# Patient Record
Sex: MALE | Race: WHITE | NOT HISPANIC OR LATINO | Employment: OTHER | URBAN - METROPOLITAN AREA
[De-identification: names, ages, dates, MRNs, and addresses within clinical notes are randomized per-mention and may not be internally consistent; named-entity substitution may affect disease eponyms.]

---

## 2023-08-22 PROBLEM — R00.2 PALPITATIONS: Status: ACTIVE | Noted: 2023-08-22

## 2023-08-22 PROBLEM — J44.9 COPD (CHRONIC OBSTRUCTIVE PULMONARY DISEASE) (MULTI): Status: ACTIVE | Noted: 2023-08-22

## 2023-08-22 PROBLEM — K21.9 GERD (GASTROESOPHAGEAL REFLUX DISEASE): Status: ACTIVE | Noted: 2023-08-22

## 2023-08-22 PROBLEM — G47.30 SLEEP APNEA: Status: ACTIVE | Noted: 2023-08-22

## 2023-08-22 PROBLEM — I73.9 PVD (PERIPHERAL VASCULAR DISEASE) (CMS-HCC): Status: ACTIVE | Noted: 2023-08-22

## 2023-08-22 PROBLEM — E78.5 HYPERLIPIDEMIA: Status: ACTIVE | Noted: 2023-08-22

## 2023-08-22 PROBLEM — I48.19 PERSISTENT ATRIAL FIBRILLATION WITH RVR (MULTI): Status: ACTIVE | Noted: 2023-08-22

## 2023-08-22 PROBLEM — I10 HYPERTENSION: Status: ACTIVE | Noted: 2023-08-22

## 2023-08-22 PROBLEM — E11.59: Status: ACTIVE | Noted: 2023-08-22

## 2023-08-22 RX ORDER — FLUTICASONE PROPIONATE AND SALMETEROL 500; 50 UG/1; UG/1
POWDER RESPIRATORY (INHALATION)
COMMUNITY

## 2023-08-22 RX ORDER — GABAPENTIN 300 MG/1
2 CAPSULE ORAL DAILY
COMMUNITY

## 2023-08-22 RX ORDER — ALBUTEROL SULFATE 90 UG/1
AEROSOL, METERED RESPIRATORY (INHALATION)
COMMUNITY
End: 2023-11-10 | Stop reason: ALTCHOICE

## 2023-08-22 RX ORDER — POTASSIUM CHLORIDE 20 MEQ/1
TABLET, EXTENDED RELEASE ORAL
COMMUNITY

## 2023-08-22 RX ORDER — SEMAGLUTIDE 1.34 MG/ML
INJECTION, SOLUTION SUBCUTANEOUS
COMMUNITY
End: 2023-11-10 | Stop reason: ALTCHOICE

## 2023-08-22 RX ORDER — FUROSEMIDE 20 MG/1
2 TABLET ORAL DAILY
COMMUNITY
End: 2024-06-10 | Stop reason: SDUPTHER

## 2023-08-22 RX ORDER — OMEPRAZOLE 20 MG/1
20 CAPSULE, DELAYED RELEASE ORAL
Status: ON HOLD | COMMUNITY
End: 2023-10-04 | Stop reason: SDUPTHER

## 2023-08-22 RX ORDER — ROSUVASTATIN CALCIUM 20 MG/1
1 TABLET, COATED ORAL DAILY
COMMUNITY
End: 2024-04-18

## 2023-08-22 RX ORDER — SITAGLIPTIN AND METFORMIN HYDROCHLORIDE 1000; 50 MG/1; MG/1
1 TABLET, FILM COATED ORAL
Status: ON HOLD | COMMUNITY
End: 2023-10-03 | Stop reason: ENTERED-IN-ERROR

## 2023-08-22 RX ORDER — METOPROLOL TARTRATE 50 MG/1
1 TABLET ORAL 2 TIMES DAILY
COMMUNITY

## 2023-08-22 RX ORDER — AMLODIPINE BESYLATE 5 MG/1
1 TABLET ORAL DAILY
Status: ON HOLD | COMMUNITY
End: 2023-10-03 | Stop reason: ENTERED-IN-ERROR

## 2023-10-03 ENCOUNTER — ANESTHESIA EVENT (OUTPATIENT)
Dept: CARDIOLOGY | Facility: HOSPITAL | Age: 58
End: 2023-10-03
Payer: MEDICARE

## 2023-10-03 ENCOUNTER — ANESTHESIA (OUTPATIENT)
Dept: CARDIOLOGY | Facility: HOSPITAL | Age: 58
End: 2023-10-03
Payer: MEDICARE

## 2023-10-03 ENCOUNTER — HOSPITAL ENCOUNTER (OUTPATIENT)
Facility: HOSPITAL | Age: 58
Discharge: HOME | End: 2023-10-04
Attending: INTERNAL MEDICINE | Admitting: INTERNAL MEDICINE
Payer: MEDICARE

## 2023-10-03 DIAGNOSIS — I48.19 PERSISTENT ATRIAL FIBRILLATION (MULTI): ICD-10-CM

## 2023-10-03 DIAGNOSIS — K21.9 GASTROESOPHAGEAL REFLUX DISEASE, UNSPECIFIED WHETHER ESOPHAGITIS PRESENT: Primary | ICD-10-CM

## 2023-10-03 LAB — GLUCOSE BLD MANUAL STRIP-MCNC: 108 MG/DL (ref 74–99)

## 2023-10-03 PROCEDURE — 92960 CARDIOVERSION ELECTRIC EXT: CPT | Performed by: INTERNAL MEDICINE

## 2023-10-03 PROCEDURE — 7100000009 HC PHASE TWO TIME - INITIAL BASE CHARGE: Performed by: INTERNAL MEDICINE

## 2023-10-03 PROCEDURE — 2500000004 HC RX 250 GENERAL PHARMACY W/ HCPCS (ALT 636 FOR OP/ED): Performed by: INTERNAL MEDICINE

## 2023-10-03 PROCEDURE — 2500000005 HC RX 250 GENERAL PHARMACY W/O HCPCS: Performed by: ANESTHESIOLOGIST ASSISTANT

## 2023-10-03 PROCEDURE — 93656 COMPRE EP EVAL ABLTJ ATR FIB: CPT | Performed by: INTERNAL MEDICINE

## 2023-10-03 PROCEDURE — 2500000004 HC RX 250 GENERAL PHARMACY W/ HCPCS (ALT 636 FOR OP/ED)

## 2023-10-03 PROCEDURE — 2500000001 HC RX 250 WO HCPCS SELF ADMINISTERED DRUGS (ALT 637 FOR MEDICARE OP): Performed by: STUDENT IN AN ORGANIZED HEALTH CARE EDUCATION/TRAINING PROGRAM

## 2023-10-03 PROCEDURE — C1766 INTRO/SHEATH,STRBLE,NON-PEEL: HCPCS | Performed by: INTERNAL MEDICINE

## 2023-10-03 PROCEDURE — 2720000007 HC OR 272 NO HCPCS: Performed by: INTERNAL MEDICINE

## 2023-10-03 PROCEDURE — 2780000003 HC OR 278 NO HCPCS: Performed by: INTERNAL MEDICINE

## 2023-10-03 PROCEDURE — 3700000001 HC GENERAL ANESTHESIA TIME - INITIAL BASE CHARGE: Performed by: INTERNAL MEDICINE

## 2023-10-03 PROCEDURE — 93657 TX L/R ATRIAL FIB ADDL: CPT | Performed by: INTERNAL MEDICINE

## 2023-10-03 PROCEDURE — C1760 CLOSURE DEV, VASC: HCPCS | Performed by: INTERNAL MEDICINE

## 2023-10-03 PROCEDURE — G0378 HOSPITAL OBSERVATION PER HR: HCPCS

## 2023-10-03 PROCEDURE — 85347 COAGULATION TIME ACTIVATED: CPT | Mod: CMCLAB,59

## 2023-10-03 PROCEDURE — 92960 CARDIOVERSION ELECTRIC EXT: CPT | Mod: 59 | Performed by: INTERNAL MEDICINE

## 2023-10-03 PROCEDURE — 2580000001 HC RX 258 IV SOLUTIONS: Performed by: ANESTHESIOLOGIST ASSISTANT

## 2023-10-03 PROCEDURE — 2500000004 HC RX 250 GENERAL PHARMACY W/ HCPCS (ALT 636 FOR OP/ED): Mod: MUE | Performed by: STUDENT IN AN ORGANIZED HEALTH CARE EDUCATION/TRAINING PROGRAM

## 2023-10-03 PROCEDURE — C1731 CATH, EP, 20 OR MORE ELEC: HCPCS | Performed by: INTERNAL MEDICINE

## 2023-10-03 PROCEDURE — 2500000005 HC RX 250 GENERAL PHARMACY W/O HCPCS: Performed by: STUDENT IN AN ORGANIZED HEALTH CARE EDUCATION/TRAINING PROGRAM

## 2023-10-03 PROCEDURE — C1759 CATH, INTRA ECHOCARDIOGRAPHY: HCPCS | Performed by: INTERNAL MEDICINE

## 2023-10-03 PROCEDURE — 2500000004 HC RX 250 GENERAL PHARMACY W/ HCPCS (ALT 636 FOR OP/ED): Performed by: ANESTHESIOLOGIST ASSISTANT

## 2023-10-03 PROCEDURE — 85347 COAGULATION TIME ACTIVATED: CPT | Mod: 59 | Performed by: INTERNAL MEDICINE

## 2023-10-03 PROCEDURE — C1732 CATH, EP, DIAG/ABL, 3D/VECT: HCPCS | Performed by: INTERNAL MEDICINE

## 2023-10-03 PROCEDURE — 82947 ASSAY GLUCOSE BLOOD QUANT: CPT

## 2023-10-03 PROCEDURE — 3700000002 HC GENERAL ANESTHESIA TIME - EACH INCREMENTAL 1 MINUTE: Performed by: INTERNAL MEDICINE

## 2023-10-03 PROCEDURE — 7100000010 HC PHASE TWO TIME - EACH INCREMENTAL 1 MINUTE: Performed by: INTERNAL MEDICINE

## 2023-10-03 RX ORDER — LIDOCAINE HYDROCHLORIDE 20 MG/ML
INJECTION, SOLUTION INFILTRATION; PERINEURAL AS NEEDED
Status: DISCONTINUED | OUTPATIENT
Start: 2023-10-03 | End: 2023-10-03

## 2023-10-03 RX ORDER — METOPROLOL TARTRATE 50 MG/1
50 TABLET ORAL 2 TIMES DAILY
Status: DISCONTINUED | OUTPATIENT
Start: 2023-10-03 | End: 2023-10-04 | Stop reason: HOSPADM

## 2023-10-03 RX ORDER — HEPARIN SODIUM 1000 [USP'U]/ML
INJECTION, SOLUTION INTRAVENOUS; SUBCUTANEOUS AS NEEDED
Status: DISCONTINUED | OUTPATIENT
Start: 2023-10-03 | End: 2023-10-03 | Stop reason: HOSPADM

## 2023-10-03 RX ORDER — PROPOFOL 10 MG/ML
INJECTION, EMULSION INTRAVENOUS AS NEEDED
Status: DISCONTINUED | OUTPATIENT
Start: 2023-10-03 | End: 2023-10-03

## 2023-10-03 RX ORDER — ESMOLOL HYDROCHLORIDE 10 MG/ML
INJECTION INTRAVENOUS AS NEEDED
Status: DISCONTINUED | OUTPATIENT
Start: 2023-10-03 | End: 2023-10-03

## 2023-10-03 RX ORDER — MIDAZOLAM HYDROCHLORIDE 1 MG/ML
INJECTION, SOLUTION INTRAMUSCULAR; INTRAVENOUS AS NEEDED
Status: DISCONTINUED | OUTPATIENT
Start: 2023-10-03 | End: 2023-10-03

## 2023-10-03 RX ORDER — ROSUVASTATIN CALCIUM 20 MG/1
20 TABLET, COATED ORAL DAILY
Status: DISCONTINUED | OUTPATIENT
Start: 2023-10-03 | End: 2023-10-04 | Stop reason: HOSPADM

## 2023-10-03 RX ORDER — PHENYLEPHRINE HCL IN 0.9% NACL 0.4MG/10ML
SYRINGE (ML) INTRAVENOUS AS NEEDED
Status: DISCONTINUED | OUTPATIENT
Start: 2023-10-03 | End: 2023-10-03

## 2023-10-03 RX ORDER — PROTAMINE SULFATE 10 MG/ML
INJECTION, SOLUTION INTRAVENOUS AS NEEDED
Status: DISCONTINUED | OUTPATIENT
Start: 2023-10-03 | End: 2023-10-03

## 2023-10-03 RX ORDER — PANTOPRAZOLE SODIUM 40 MG/1
40 TABLET, DELAYED RELEASE ORAL 2 TIMES DAILY
Status: DISCONTINUED | OUTPATIENT
Start: 2023-10-03 | End: 2023-10-04 | Stop reason: HOSPADM

## 2023-10-03 RX ORDER — FENTANYL CITRATE 50 UG/ML
INJECTION, SOLUTION INTRAMUSCULAR; INTRAVENOUS AS NEEDED
Status: DISCONTINUED | OUTPATIENT
Start: 2023-10-03 | End: 2023-10-03

## 2023-10-03 RX ORDER — ALBUTEROL SULFATE 0.83 MG/ML
2.5 SOLUTION RESPIRATORY (INHALATION) ONCE AS NEEDED
Status: DISCONTINUED | OUTPATIENT
Start: 2023-10-03 | End: 2023-10-04 | Stop reason: HOSPADM

## 2023-10-03 RX ORDER — ROCURONIUM BROMIDE 10 MG/ML
INJECTION, SOLUTION INTRAVENOUS AS NEEDED
Status: DISCONTINUED | OUTPATIENT
Start: 2023-10-03 | End: 2023-10-03

## 2023-10-03 RX ORDER — FUROSEMIDE 40 MG/1
40 TABLET ORAL DAILY
Status: DISCONTINUED | OUTPATIENT
Start: 2023-10-04 | End: 2023-10-04 | Stop reason: HOSPADM

## 2023-10-03 RX ORDER — ONDANSETRON HYDROCHLORIDE 2 MG/ML
INJECTION, SOLUTION INTRAVENOUS AS NEEDED
Status: DISCONTINUED | OUTPATIENT
Start: 2023-10-03 | End: 2023-10-03

## 2023-10-03 RX ORDER — HEPARIN SODIUM 10000 [USP'U]/100ML
INJECTION, SOLUTION INTRAVENOUS CONTINUOUS PRN
Status: DISCONTINUED | OUTPATIENT
Start: 2023-10-03 | End: 2023-10-03 | Stop reason: HOSPADM

## 2023-10-03 RX ORDER — VALSARTAN 80 MG/1
80 TABLET ORAL DAILY
Status: DISCONTINUED | OUTPATIENT
Start: 2023-10-03 | End: 2023-10-04 | Stop reason: HOSPADM

## 2023-10-03 RX ORDER — SODIUM CHLORIDE, SODIUM LACTATE, POTASSIUM CHLORIDE, CALCIUM CHLORIDE 600; 310; 30; 20 MG/100ML; MG/100ML; MG/100ML; MG/100ML
100 INJECTION, SOLUTION INTRAVENOUS CONTINUOUS
Status: DISCONTINUED | OUTPATIENT
Start: 2023-10-03 | End: 2023-10-04 | Stop reason: HOSPADM

## 2023-10-03 RX ORDER — PHENYLEPHRINE 10 MG/250 ML(40 MCG/ML)IN 0.9 % SOD.CHLORIDE INTRAVENOUS
CONTINUOUS PRN
Status: DISCONTINUED | OUTPATIENT
Start: 2023-10-03 | End: 2023-10-03

## 2023-10-03 RX ORDER — GABAPENTIN 300 MG/1
600 CAPSULE ORAL DAILY
Status: DISCONTINUED | OUTPATIENT
Start: 2023-10-04 | End: 2023-10-04 | Stop reason: HOSPADM

## 2023-10-03 RX ORDER — DOXYCYCLINE 100 MG/1
100 CAPSULE ORAL 2 TIMES DAILY
COMMUNITY
End: 2023-11-10 | Stop reason: ALTCHOICE

## 2023-10-03 RX ORDER — ONDANSETRON HYDROCHLORIDE 2 MG/ML
4 INJECTION, SOLUTION INTRAVENOUS ONCE AS NEEDED
Status: DISCONTINUED | OUTPATIENT
Start: 2023-10-03 | End: 2023-10-04 | Stop reason: HOSPADM

## 2023-10-03 RX ORDER — VALSARTAN 80 MG/1
80 TABLET ORAL DAILY
COMMUNITY
End: 2024-06-10 | Stop reason: ALTCHOICE

## 2023-10-03 RX ADMIN — ROCURONIUM BROMIDE 10 MG: 10 INJECTION, SOLUTION INTRAVENOUS at 18:00

## 2023-10-03 RX ADMIN — ROCURONIUM BROMIDE 20 MG: 10 INJECTION, SOLUTION INTRAVENOUS at 16:20

## 2023-10-03 RX ADMIN — ROCURONIUM BROMIDE 20 MG: 10 INJECTION, SOLUTION INTRAVENOUS at 17:20

## 2023-10-03 RX ADMIN — SODIUM CHLORIDE, SODIUM LACTATE, POTASSIUM CHLORIDE, AND CALCIUM CHLORIDE: 600; 310; 30; 20 INJECTION, SOLUTION INTRAVENOUS at 14:15

## 2023-10-03 RX ADMIN — MIDAZOLAM 1 MG: 1 INJECTION INTRAMUSCULAR; INTRAVENOUS at 14:28

## 2023-10-03 RX ADMIN — PROPOFOL 50 MG: 10 INJECTION, EMULSION INTRAVENOUS at 14:30

## 2023-10-03 RX ADMIN — MIDAZOLAM 1 MG: 1 INJECTION INTRAMUSCULAR; INTRAVENOUS at 14:25

## 2023-10-03 RX ADMIN — ROCURONIUM BROMIDE 100 MG: 10 INJECTION, SOLUTION INTRAVENOUS at 14:30

## 2023-10-03 RX ADMIN — ONDANSETRON 4 MG: 2 INJECTION INTRAMUSCULAR; INTRAVENOUS at 18:34

## 2023-10-03 RX ADMIN — METOPROLOL TARTRATE 50 MG: 50 TABLET, FILM COATED ORAL at 22:53

## 2023-10-03 RX ADMIN — SUGAMMADEX 200 MG: 100 INJECTION, SOLUTION INTRAVENOUS at 18:44

## 2023-10-03 RX ADMIN — PROPOFOL 150 MG: 10 INJECTION, EMULSION INTRAVENOUS at 14:29

## 2023-10-03 RX ADMIN — Medication 200 MCG: at 14:48

## 2023-10-03 RX ADMIN — LIDOCAINE HYDROCHLORIDE 100 MG: 20 INJECTION, SOLUTION INFILTRATION; PERINEURAL at 14:28

## 2023-10-03 RX ADMIN — PANTOPRAZOLE SODIUM 40 MG: 40 TABLET, DELAYED RELEASE ORAL at 22:53

## 2023-10-03 RX ADMIN — PROTAMINE SULFATE 30 MG: 10 INJECTION, SOLUTION INTRAVENOUS at 18:28

## 2023-10-03 RX ADMIN — Medication 0.3 MCG/KG/MIN: at 14:48

## 2023-10-03 RX ADMIN — SODIUM CHLORIDE, SODIUM LACTATE, POTASSIUM CHLORIDE, AND CALCIUM CHLORIDE: 600; 310; 30; 20 INJECTION, SOLUTION INTRAVENOUS at 14:16

## 2023-10-03 RX ADMIN — ROSUVASTATIN CALCIUM 20 MG: 20 TABLET, FILM COATED ORAL at 22:53

## 2023-10-03 RX ADMIN — FENTANYL CITRATE 100 MCG: 50 INJECTION, SOLUTION INTRAMUSCULAR; INTRAVENOUS at 14:27

## 2023-10-03 RX ADMIN — ESMOLOL HYDROCHLORIDE 100 MG: 10 INJECTION, SOLUTION INTRAVENOUS at 14:30

## 2023-10-03 RX ADMIN — VALSARTAN 80 MG: 80 TABLET, FILM COATED ORAL at 22:53

## 2023-10-03 SDOH — HEALTH STABILITY: MENTAL HEALTH: CURRENT SMOKER: 1

## 2023-10-03 ASSESSMENT — COGNITIVE AND FUNCTIONAL STATUS - GENERAL
DRESSING REGULAR LOWER BODY CLOTHING: A LITTLE
STANDING UP FROM CHAIR USING ARMS: A LITTLE
CLIMB 3 TO 5 STEPS WITH RAILING: A LITTLE
TURNING FROM BACK TO SIDE WHILE IN FLAT BAD: A LITTLE
HELP NEEDED FOR BATHING: A LITTLE
MOVING TO AND FROM BED TO CHAIR: A LITTLE
MOVING FROM LYING ON BACK TO SITTING ON SIDE OF FLAT BED WITH BEDRAILS: A LITTLE
DAILY ACTIVITIY SCORE: 18
PERSONAL GROOMING: A LITTLE
WALKING IN HOSPITAL ROOM: A LITTLE
MOBILITY SCORE: 18
EATING MEALS: A LITTLE
TOILETING: A LITTLE
DRESSING REGULAR UPPER BODY CLOTHING: A LITTLE

## 2023-10-03 ASSESSMENT — ENCOUNTER SYMPTOMS: PALPITATIONS: 1

## 2023-10-03 ASSESSMENT — PAIN SCALES - GENERAL: PAINLEVEL_OUTOF10: 0 - NO PAIN

## 2023-10-03 ASSESSMENT — COLUMBIA-SUICIDE SEVERITY RATING SCALE - C-SSRS
2. HAVE YOU ACTUALLY HAD ANY THOUGHTS OF KILLING YOURSELF?: NO
6. HAVE YOU EVER DONE ANYTHING, STARTED TO DO ANYTHING, OR PREPARED TO DO ANYTHING TO END YOUR LIFE?: NO
1. IN THE PAST MONTH, HAVE YOU WISHED YOU WERE DEAD OR WISHED YOU COULD GO TO SLEEP AND NOT WAKE UP?: NO

## 2023-10-03 ASSESSMENT — PAIN - FUNCTIONAL ASSESSMENT: PAIN_FUNCTIONAL_ASSESSMENT: 0-10

## 2023-10-03 NOTE — HOSPITAL COURSE
CARDS: Ciarra Andres    59yo M with HTN, HLD, DM2, PAD, morbid obesity, GERD, SERA, and persistent AF. Here for PVI, s/p PVI+ adjuvant 10/3/23.  - Echo 6/2023: EF 55%,(in AF) no WMA, mild cLVH, NL RV size/fxn, mild LAE/NL RA, tr-mild TR, rvsp 33mm

## 2023-10-03 NOTE — ANESTHESIA PROCEDURE NOTES
Arterial Line:    Date/Time: 10/3/2023 3:27 PM    Staffing  Performed: MARIO ALBERTO   Authorized by: Ryder Ann MD    Performed by: VIJAYA Sheffield    An arterial line was placed. Procedure performed using surface landmarks.in the OR for the following indication(s): continuous blood pressure monitoring and blood sampling needed.    A 20 gauge (size) (length), Angiocath (type) catheter was placed into the Left radial artery, secured by tape,   Seldinger technique used.        Additional notes:  Good waveform, no abnormalities or hematom

## 2023-10-03 NOTE — ANESTHESIA POSTPROCEDURE EVALUATION
Patient: Arthur Thornton    Procedure Summary       Date: 10/03/23 [place] [facility] [place] [facility] Cath Lab    Anesthesia Start: 1409 Anesthesia Stop:     Procedure: Ablation A-Fib Persistant Diagnosis:       Persistent atrial fibrillation (CMS/HCC)      (s/p AF Ablation)    Providers: Albert Cabrera MD Responsible Provider: VIJAYA Sheffield    Anesthesia Type: general ASA Status: 3            Anesthesia Type: general    Vitals Value Taken Time   /70 36/0   Temp 36.0 10/03/23 1916   Pulse 73 10/03/23 1916   Resp 16 10/03/23 1916   SpO2 93 10/03/23 1916       Anesthesia Post Evaluation    Patient location during evaluation: PACU  Patient participation: complete - patient participated  Level of consciousness: awake and alert  Pain management: satisfactory to patient  Multimodal analgesia pain management approach  Airway patency: patent  Cardiovascular status: acceptable and blood pressure returned to baseline  Respiratory status: acceptable  Hydration status: acceptable        There were no known notable events for this encounter.

## 2023-10-03 NOTE — ANESTHESIA PREPROCEDURE EVALUATION
Patient: Arthur Thornton    Procedure Information       Date/Time: 10/03/23 1300    Procedure: Ablation A-Fib Persistant - CARTO  11am arrival for 1pm appointment    Location: [facility] / [facility] Cardiac Cath Lab    Providers: Albert Cabrera MD          57 yo M s/f Afib ablation.  Other hx includes morbid obesity, PVD s/p partial toe amputation, COPD on O2 NC PRN, HTN, DM2 (ozempic), GERD (controlled with PPI), SERA (noncompliant), tobacco abuse (0.5ppd).  No issues with anesthesia except during one surgery had delayed emergence.    PSH  Elbow sx  Hip replacement  Partial toe amputation    Relevant Problems   Cardiovascular   (+) Hyperlipidemia   (+) Hypertension   (+) PVD (peripheral vascular disease) (CMS/HCC)   (+) Persistent atrial fibrillation with RVR (CMS/HCC)      GI   (+) GERD (gastroesophageal reflux disease)      Pulmonary   (+) COPD (chronic obstructive pulmonary disease) (CMS/HCC)       Clinical information reviewed:    Allergies  Meds               NPO Detail:  No data recorded     Physical Exam    Airway  Mallampati: III  TM distance: >3 FB  Neck ROM: full  Comments: Thick neck, beard   Cardiovascular   Rate: normal     Dental   Comments: Endenture less, upper and lower   Pulmonary   Breath sounds clear to auscultation     Abdominal   (+) obese             Anesthesia Plan    ASA 3     general     The patient is a current smoker.  Patient was previously instructed to abstain from smoking on day of procedure.    intravenous induction   Postoperative administration of opioids is intended.  Anesthetic plan and risks discussed with patient.    Plan discussed with CRNA.

## 2023-10-03 NOTE — H&P
History Of Present Illness  Arthur Thornton is a 58 y.o. male presenting with atrial fibrillation.    Arthur Thornton is a 59 y/o male referred by Dr Andres for evaluation of AF.  PMH includes HTN, HLD, GERD, DM, obesity, COPD, SERA, and AF (diagnosed about 4 yrs ago).  Treatment of his AF includes DCCV (~2019), Amiodarone (side effects) and metoprolol.  Symptoms of his AF includes fatigue and LEON.  Pt recently established with Dr Andres after presenting to his PCP in AF. Pt was initially diagnosed 4 yrs ago and underwent DCCV.   Pt reoccurred quickly and was started on Amio. Pt did not tolerate Amiodarone d/t side effects. Pt was taken off and maintained SR  until recently. He saw his PCP earlier this year when he noted he was back in AF. Dr Andres ordered a Nuc stress and Echo which  pt is scheduled for 6/22/23. Pt is currently managed on metoprolol and Eliquis. Pt presents today to discuss management of his AF.      Echo 5/2022: EF 55-60%, mild concentric LVH, LA & RA mildly dilated, trace MR, mild TRRVSP c/w mod pHTN           Past Medical History  He has a past medical history of Afib (CMS/HCC), Hyperlipidemia, and Hypertension.    Surgical History  He has no past surgical history on file.     Social History  He reports that he has been smoking cigarettes. He has a 15.00 pack-year smoking history. He does not have any smokeless tobacco history on file. He reports current alcohol use. He reports that he does not use drugs.    Family History  Family History   Problem Relation Name Age of Onset    Cancer Mother      Other (myocardial infarction) Father          Allergies  Patient has no known allergies.    Review of Systems   Cardiovascular:  Positive for palpitations. Negative for chest pain and leg swelling.   All other systems reviewed and are negative.       Physical Exam  Vitals reviewed.   Constitutional:       General: He is not in acute distress.     Appearance: Normal appearance. He is normal weight.    HENT:      Head: Normocephalic and atraumatic.      Nose: Nose normal. No congestion or rhinorrhea.      Mouth/Throat:      Mouth: Mucous membranes are moist.      Pharynx: Oropharynx is clear. No oropharyngeal exudate or posterior oropharyngeal erythema.   Eyes:      Extraocular Movements: Extraocular movements intact.      Conjunctiva/sclera: Conjunctivae normal.      Pupils: Pupils are equal, round, and reactive to light.   Neck:      Vascular: No carotid bruit.   Cardiovascular:      Rate and Rhythm: Normal rate and regular rhythm.      Pulses: Normal pulses.      Heart sounds: Normal heart sounds. No murmur heard.     No friction rub. No gallop.   Pulmonary:      Effort: Pulmonary effort is normal. No respiratory distress.      Breath sounds: Normal breath sounds. No wheezing or rales.   Abdominal:      General: Abdomen is flat. Bowel sounds are normal. There is no distension.      Palpations: Abdomen is soft.      Tenderness: There is no abdominal tenderness.   Musculoskeletal:         General: No swelling. Normal range of motion.      Cervical back: Normal range of motion.   Lymphadenopathy:      Cervical: No cervical adenopathy.   Skin:     General: Skin is warm and dry.      Capillary Refill: Capillary refill takes less than 2 seconds.      Findings: No erythema, lesion or rash.   Neurological:      General: No focal deficit present.      Mental Status: He is alert and oriented to person, place, and time. Mental status is at baseline.   Psychiatric:         Mood and Affect: Mood normal.         Behavior: Behavior normal.         Thought Content: Thought content normal.         Judgment: Judgment normal.          Last Recorded Vitals  There were no vitals taken for this visit.    Relevant Results        Paroxysmam      Assessment/Plan   Principal Problem:    Persistent atrial fibrillation with RVR (CMS/HCC)  -radiofrequency ablation with general anesthesia        I spent 30 minutes in the professional and  overall care of this patient.      Gorge Raygoza MD

## 2023-10-03 NOTE — ANESTHESIA PROCEDURE NOTES
Airway  Date/Time: 10/3/2023 2:31 PM  Urgency: elective    Airway not difficult    Staffing  Performed: MARIO ALBERTO   Authorized by: Ryder Ann MD    Performed by: VIJAYA Sheffield  Patient location during procedure: OR    Indications and Patient Condition  Indications for airway management: anesthesia  Spontaneous ventilation: present  Preoxygenated: yes  Patient position: ramp  Mask difficulty assessment: 2 - vent by mask + OA or adjuvant +/- NMBA    Final Airway Details  Final airway type: endotracheal airway      Successful airway: ETT  Cuffed: yes   Successful intubation technique: video laryngoscopy  ETT size (mm): 7.5  Cormack-Lehane Classification: grade I - full view of glottis  Measured from: lips  Number of attempts at approach: 1  Ventilation between attempts: 2 hand mask  Number of other approaches attempted: 0

## 2023-10-03 NOTE — PROGRESS NOTES
Pharmacy Medication History Review    Arthur Thornton is a 58 y.o. male admitted for No Principal Problem: There is no principal problem currently on the Problem List. Please update the Problem List and refresh.. Pharmacy reviewed the patient's [id number] medications and allergies for accuracy.    The list below reflectives the updated PTA list. Please review each medication in order reconciliation for additional clarification and justification.  Medications Prior to Admission   Medication Sig Dispense Refill Last Dose    albuterol (Proventil HFA) 90 mcg/actuation inhaler    Past Week    apixaban (Eliquis) 5 mg tablet Take 1 tablet (5 mg) by mouth 2 times a day.   10/2/2023    doxycycline (Vibramycin) 100 mg capsule Take 1 capsule (100 mg) by mouth 2 times a day. Take with at least 8 ounces (large glass) of water, do not lie down for 30 minutes after   10/2/2023    fluticasone propion-salmeteroL (Advair Diskus) 500-50 mcg/dose diskus inhaler    10/3/2023    furosemide (Lasix) 20 mg tablet Take 2 tablets (40 mg) by mouth once daily.   10/2/2023    gabapentin (Neurontin) 300 mg capsule Take 2 capsules (600 mg) by mouth early in the morning..   10/2/2023    metoprolol tartrate (Lopressor) 50 mg tablet Take 1 tablet by mouth 2 times a day.   10/2/2023    omeprazole (PriLOSEC) 20 mg DR capsule Take 1 capsule (20 mg) by mouth once daily in the morning. Take before meals.       potassium chloride CR (Klor-Con M20) 20 mEq ER tablet    Unknown    rosuvastatin (Crestor) 20 mg tablet Take 1 tablet (20 mg) by mouth once daily.   10/2/2023    semaglutide (Ozempic) 0.25 mg or 0.5 mg(2 mg/1.5 mL) pen injector Inject under the skin 1 (one) time per week.   9/29/2023    valsartan (Diovan) 80 mg tablet Take 1 tablet (80 mg) by mouth once daily.   10/2/2023        The list below reflectives the updated allergy list. Please review each documented allergy for additional clarification and justification.  Allergies  Never Reviewed    No Known Allergies     The patient was a great historian who knew medications and dosages. The sure scripts , and oarrs Gabapentin 180/45 filled 7/27 used also    Below are additional concerns with the patient's PTA list.      Denzel Campoverde [facility]

## 2023-10-03 NOTE — DISCHARGE INSTRUCTIONS
* You will need to continue blood thinner (Eliquis) until instructed otherwise. It is important not to interrupt blood thinner for any reason (other than an emergency) during the 1st month after ablation.    * You will be on Pantoprazole (a heartburn medicine) for 4 weeks to protect the esophagus as it can become irritated with ablation. It is very important that you take this medication. You can resume your home omeprazole once you're done with this.    * All other medications will generally remain the same unless you are told otherwise.  Resume taking your home medications today (including blood thinner) as listed on the discharge instructions.    * In the first week post-ablation you should take it easy. No heavy lifting or heavy exercise, no treadmill. You can use the stairs if needed but go slowly and minimize the number of times up and down.    * Some minor bruising is common at each groin access site with minor soreness as if you had banged the area. Bruising may occasionally be seen to extend down the leg. This is normal as is an occasional small quarter sized bump in the area. If larger swelling or more significant pain occurs at the area, please contact the office or go the nearest Emergency Room.    * You may have some minor chest pain for the next week or so. The pain will often worsen with a deep breath and be better when leaning forward. This is pericardial chest pain from the ablation and is generally not of concern. It should resolve within a week although it might increase for a day or so after the ablation.    * If you develop unexplained fevers exceeding 100 degrees anytime within the first 3 weeks post-ablation, you need to contact the office. Low grade fevers of around 99 degrees are common in the first day or so post-ablation.    * Atrial fibrillation (AFib) can recur in all patients who undergo this ablation for up to 4-8 weeks post-ablation. The ablation itself can cause inflammation  (pericarditis) in the atria and this can cause AFib. Some patients will actually experience an increased amount of atrial arrhythmia early after ablation. Approximately 1/3 of patients will have this early recurrence of AFib. Medications should be continued and your heart rate controlled. Nothing else needs be done initially except waiting as in many cases these episodes of AFib will prove self limited.    * Continue to follow up with your primary care physician, primary cardiologist, and any other specialists you normally see.    *No driving for 2 days post procedure (IF you were driving prior to procedure)    *Diet: Heart healthy    Call Provider If:  Breathing faster than normal.     Fever of 100.4 F (38 C) or higher.     Chills.     Any new concerning symptoms.     Passing out.     Patient Instructions, Next 24 hours:  DO NOT drive a car, operate machinery or power tools.  It is recommended that a responsible adult be with you for the first 24 hours.     DO NOT drink any alcoholic drinks or take any non-prescriptive medications that contain alcohol for the first 24 hours.     DO NOT make any important decisions for the first 24 hours.    Activity:  You are advised to go directly home from the hospital.     DO NOT lift anything heavier than 10 pounds for one week, this allows for proper healing of the groin.     No excessive exercise or treadmill use for one week. You may walk and do stairs, slowly.     No sexual activities for 24 hours after you arrive home.    Wound Care:  If slight bleeding should occur at groin site, lie down and have someone apply firm pressure just above the puncture site for 5 minutes.  If it continues or is profuse, call 911. Always notify your doctor if bleeding occurs.     Keep site clean and dry. Let air dry or you may use a simple bandaid.     Gently cleanse the puncture site in your groin with soap and water only.     You may experience some tenderness, bruising or minimal  inflammation.  If you have any concerns, you may contact the Cath Lab or if any of these symptoms become excessive, contact your cardiologist or go to the emergency room.     No tub baths, soaking, or swimming for one week.     May shower the next day after your procedure.    Other Instructions:  If you have any questions about the effects of the sedative drugs or groin care, call the physician who performed your procedure.    FOLLOW UP:    1) PCP - 2 weeks  -Call to schedule    2) Omayra Ojeda CNP ( Electrophysiology)-->as scheduled

## 2023-10-04 VITALS
TEMPERATURE: 96.8 F | DIASTOLIC BLOOD PRESSURE: 79 MMHG | BODY MASS INDEX: 40.61 KG/M2 | WEIGHT: 306.44 LBS | RESPIRATION RATE: 17 BRPM | HEART RATE: 77 BPM | SYSTOLIC BLOOD PRESSURE: 139 MMHG | OXYGEN SATURATION: 92 % | HEIGHT: 73 IN

## 2023-10-04 PROBLEM — I48.19 PERSISTENT ATRIAL FIBRILLATION (MULTI): Status: ACTIVE | Noted: 2023-10-04

## 2023-10-04 PROCEDURE — 93005 ELECTROCARDIOGRAM TRACING: CPT

## 2023-10-04 PROCEDURE — 2500000001 HC RX 250 WO HCPCS SELF ADMINISTERED DRUGS (ALT 637 FOR MEDICARE OP): Performed by: STUDENT IN AN ORGANIZED HEALTH CARE EDUCATION/TRAINING PROGRAM

## 2023-10-04 PROCEDURE — 2500000004 HC RX 250 GENERAL PHARMACY W/ HCPCS (ALT 636 FOR OP/ED): Performed by: STUDENT IN AN ORGANIZED HEALTH CARE EDUCATION/TRAINING PROGRAM

## 2023-10-04 PROCEDURE — G0378 HOSPITAL OBSERVATION PER HR: HCPCS

## 2023-10-04 PROCEDURE — 7100000011 HC EXTENDED STAY RECOVERY HOURLY - NURSING UNIT

## 2023-10-04 PROCEDURE — 99239 HOSP IP/OBS DSCHRG MGMT >30: CPT | Performed by: PHYSICIAN ASSISTANT

## 2023-10-04 RX ORDER — OMEPRAZOLE 20 MG/1
20 CAPSULE, DELAYED RELEASE ORAL
Qty: 30 CAPSULE | Refills: 0 | Status: SHIPPED | OUTPATIENT
Start: 2023-10-04

## 2023-10-04 RX ORDER — PANTOPRAZOLE SODIUM 40 MG/1
40 TABLET, DELAYED RELEASE ORAL 2 TIMES DAILY
Qty: 60 TABLET | Refills: 0 | Status: SHIPPED | OUTPATIENT
Start: 2023-10-04 | End: 2023-11-10

## 2023-10-04 RX ADMIN — PANTOPRAZOLE SODIUM 40 MG: 40 TABLET, DELAYED RELEASE ORAL at 08:36

## 2023-10-04 RX ADMIN — APIXABAN 5 MG: 5 TABLET, FILM COATED ORAL at 08:38

## 2023-10-04 RX ADMIN — METOPROLOL TARTRATE 50 MG: 50 TABLET, FILM COATED ORAL at 08:37

## 2023-10-04 RX ADMIN — VALSARTAN 80 MG: 80 TABLET, FILM COATED ORAL at 08:37

## 2023-10-04 RX ADMIN — FUROSEMIDE 40 MG: 40 TABLET ORAL at 08:36

## 2023-10-04 SDOH — SOCIAL STABILITY: SOCIAL INSECURITY: WERE YOU ABLE TO COMPLETE ALL THE BEHAVIORAL HEALTH SCREENINGS?: YES

## 2023-10-04 SDOH — SOCIAL STABILITY: SOCIAL INSECURITY: HAS ANYONE EVER THREATENED TO HURT YOUR FAMILY OR YOUR PETS?: NO

## 2023-10-04 SDOH — SOCIAL STABILITY: SOCIAL INSECURITY: DO YOU FEEL UNSAFE GOING BACK TO THE PLACE WHERE YOU ARE LIVING?: NO

## 2023-10-04 SDOH — SOCIAL STABILITY: SOCIAL INSECURITY: DO YOU FEEL ANYONE HAS EXPLOITED OR TAKEN ADVANTAGE OF YOU FINANCIALLY OR OF YOUR PERSONAL PROPERTY?: NO

## 2023-10-04 SDOH — SOCIAL STABILITY: SOCIAL INSECURITY: ABUSE: ADULT

## 2023-10-04 SDOH — SOCIAL STABILITY: SOCIAL INSECURITY: DOES ANYONE TRY TO KEEP YOU FROM HAVING/CONTACTING OTHER FRIENDS OR DOING THINGS OUTSIDE YOUR HOME?: NO

## 2023-10-04 SDOH — SOCIAL STABILITY: SOCIAL INSECURITY: ARE YOU OR HAVE YOU BEEN THREATENED OR ABUSED PHYSICALLY, EMOTIONALLY, OR SEXUALLY BY ANYONE?: NO

## 2023-10-04 SDOH — SOCIAL STABILITY: SOCIAL INSECURITY: HAVE YOU HAD THOUGHTS OF HARMING ANYONE ELSE?: NO

## 2023-10-04 SDOH — SOCIAL STABILITY: SOCIAL INSECURITY: ARE THERE ANY APPARENT SIGNS OF INJURIES/BEHAVIORS THAT COULD BE RELATED TO ABUSE/NEGLECT?: NO

## 2023-10-04 ASSESSMENT — COGNITIVE AND FUNCTIONAL STATUS - GENERAL
MOBILITY SCORE: 24
DAILY ACTIVITIY SCORE: 24

## 2023-10-04 ASSESSMENT — LIFESTYLE VARIABLES
SKIP TO QUESTIONS 9-10: 1
AUDIT-C TOTAL SCORE: 2
AUDIT-C TOTAL SCORE: 2
HOW OFTEN DO YOU HAVE A DRINK CONTAINING ALCOHOL: 2-4 TIMES A MONTH
HOW OFTEN DO YOU HAVE 6 OR MORE DRINKS ON ONE OCCASION: NEVER
HOW MANY STANDARD DRINKS CONTAINING ALCOHOL DO YOU HAVE ON A TYPICAL DAY: 1 OR 2

## 2023-10-04 ASSESSMENT — PATIENT HEALTH QUESTIONNAIRE - PHQ9
1. LITTLE INTEREST OR PLEASURE IN DOING THINGS: NOT AT ALL
SUM OF ALL RESPONSES TO PHQ9 QUESTIONS 1 & 2: 0
2. FEELING DOWN, DEPRESSED OR HOPELESS: NOT AT ALL

## 2023-10-04 NOTE — CARE PLAN
Problem: Diabetes  Goal: Achieve decreasing blood glucose levels by end of shift  Outcome: Progressing  Goal: Increase stability of blood glucose readings by end of shift  Outcome: Progressing  Goal: Decrease in ketones present in urine by end of shift  Outcome: Progressing  Goal: Maintain electrolyte levels within acceptable range throughout shift  Outcome: Progressing  Goal: Maintain glucose levels >70mg/dl to <250mg/dl throughout shift  Outcome: Progressing  Goal: No changes in neurological exam by end of shift  Outcome: Progressing  Goal: Learn about and adhere to nutrition recommendations by end of shift  Outcome: Progressing  Goal: Vital signs within normal range for age by end of shift  Outcome: Progressing  Goal: Increase self care and/or family involovement by end of shift  Outcome: Progressing  Goal: Receive DSME education by end of shift  Outcome: Progressing

## 2023-10-04 NOTE — PROGRESS NOTES
"Subjective Data:  -Pt was seen/examined at AM and tele reviewed  -Pt was seen by EP fellow Gorge Raygoza MD this AM also  -denies CP, dsypnea, LH, palps; feels well, ate breakfast  -has voided and ambulated without issues    /69   Pulse 68   Temp 36 °C (96.8 °F) (Temporal)   Resp 18   Ht 1.854 m (6' 1\")   Wt 139 kg (306 lb 7 oz)   SpO2 100%   BMI 40.43 kg/m²     Overnight Events:  none     Objective Data:  Last Recorded Vitals:  Vitals:    10/03/23 2009 10/03/23 2123 10/04/23 0247 10/04/23 0548   BP: 114/74  122/81 107/69   Pulse: 73  74 68   Resp: 20  19 18   Temp: 36.5 °C (97.7 °F)  36.2 °C (97.2 °F) 36 °C (96.8 °F)   TempSrc:    Temporal   SpO2: 92%  97% 100%   Weight:  139 kg (306 lb 7 oz)  139 kg (306 lb 7 oz)   Height:  1.854 m (6' 1\")         Last Labs:  CBC - No results in last year.  _ _ _    _      CMP - No results in last year.  _ _ _ --- _   _ _ _ _      PTT - No results in last year.  _   _ _     No results found for: \"TROPHS\", \"BNP\", \"HGBA1C\", \"LDLCALC\", \"VLDL\"   Last I/O:  I/O last 3 completed shifts:  In: 2800 (20.1 mL/kg) [IV Piggyback:2800]  Out: 705 (5.1 mL/kg) [Urine:700 (0.1 mL/kg/hr); Blood:5]  Weight: 139 kg     Past Cardiology Tests (Last 3 Years):  EKG: 10/4/23 9:16am (post ablation) was reviewed      Echo:  No results found for this or any previous visit from the past 1095 days.    Ejection Fraction: Ef 55% by Echo 6/2023    Cath:  No results found for this or any previous visit from the past 1095 days.    Stress Test:  Nuclear Stress Test 6/23/2023    Cardiac Imaging:  No results found for this or any previous visit from the past 1095 days.      Inpatient Medications:  Scheduled medications   Medication Dose Route Frequency    apixaban  5 mg oral BID    furosemide  40 mg oral Daily    gabapentin  600 mg oral Daily    metoprolol tartrate  50 mg oral BID    pantoprazole  40 mg oral BID    rosuvastatin  20 mg oral Daily    valsartan  80 mg oral Daily     PRN medications "   Medication    albuterol    ondansetron    promethazine (Phenergan) 6.25 mg in sodium chloride 0.9% 50 mL IV     Continuous Medications   Medication Dose Last Rate    lactated Ringer's  100 mL/hr         Physical Exam:  Gen-A+O x 3  Skin-W+D  Lungs-CTAB  CV-RRR, no rub  Abd-obese, soft, NT/ND, +BS  Extrem-1+ BLE edema; chronic hardened pretib skin bilat; RFV access site without bleeding or hematoma  Psych-appropriate mood and affect       Assessment/Plan   59yo M with HTN, HLD, DM2, PAD, morbid obesity, GERD, SERA, NL EF by echo 6/2023 and persistent AF on Eliquis. Follows with cards Dr Ciarra Andres. Yesterday underwent PVI/adjuvant lesions. In NSR and feels well.    Case was d/w EP fellow-->stable for dc home today.    -MED changes: Protonix 40mg BID x 4 wks post ablation (eRx sent), can resume home omeprazole once finishes Protonix, o/w no changes in home meds  -post procedure written instructions reviewed with pt and all questions answered  -Pt was advised on importance of adherence to Eliquis regimen without interruption for the 1st 30 days post ablation (unless in event of emergency)  -EP F/u 1 month with Omayra Ojeda CNP on 11/14/23 in [place] [address]    SHERRELL Leo, FELIZ, [facility]  Inpatient Cardiac Electrophysiology  Personal pager: [phone] (Tues-Fri)    I spent >30 minutes in the professional and overall care of this patient (post ablation teaching and coordination of discharge.    Adan Menezes PA-C

## 2023-10-04 NOTE — NURSING NOTE
Pt is being discharged home without home care. Pt has script sent to pharmacy for protonix. Med education reviewed. IV's removed. Follow-up appointments were made and indicated in instructions. Patient has no further needs at this time. Tele box removed.

## 2023-10-04 NOTE — PROGRESS NOTES
10/4/23  2450  Transitional Care Coordinator   Met with patient and introduced myself as care coordinator and member of the discharge planning team. Patient was admitted with afib w/RVR. Says he is being discharged to home and his ride is on the way . No home care needs were identified. Cassandra Singh RN

## 2023-10-16 LAB
ACT BLD: 259 SEC (ref 96–152)
ACT BLD: 282 SEC (ref 96–152)
ACT BLD: 336 SEC (ref 96–152)
ACT BLD: 352 SEC (ref 96–152)
ACT BLD: 372 SEC (ref 96–152)
ACT BLD: 375 SEC (ref 96–152)

## 2023-10-17 ENCOUNTER — TELEPHONE (OUTPATIENT)
Dept: CARDIOLOGY | Facility: CLINIC | Age: 58
End: 2023-10-17
Payer: MEDICARE

## 2023-10-17 NOTE — TELEPHONE ENCOUNTER
Patient needs a follow up appointment with you since he has had his ablation done.  He has a f/u 11/14 with the doctor that did the ablation but would like to have an ov with you.        Please advise

## 2023-10-25 ENCOUNTER — HOSPITAL ENCOUNTER (OUTPATIENT)
Dept: CARDIOLOGY | Facility: HOSPITAL | Age: 58
Discharge: HOME | End: 2023-10-25
Payer: MEDICARE

## 2023-10-25 LAB
ATRIAL RATE: 69 BPM
P AXIS: 71 DEGREES
P OFFSET: 192 MS
P ONSET: 112 MS
PR INTERVAL: 200 MS
Q ONSET: 212 MS
QRS COUNT: 11 BEATS
QRS DURATION: 96 MS
QT INTERVAL: 400 MS
QTC CALCULATION(BAZETT): 428 MS
QTC FREDERICIA: 419 MS
R AXIS: 50 DEGREES
T AXIS: 46 DEGREES
T OFFSET: 412 MS
VENTRICULAR RATE: 69 BPM

## 2023-11-10 ENCOUNTER — OFFICE VISIT (OUTPATIENT)
Dept: CARDIOLOGY | Facility: CLINIC | Age: 58
End: 2023-11-10
Payer: MEDICARE

## 2023-11-10 VITALS
SYSTOLIC BLOOD PRESSURE: 136 MMHG | HEIGHT: 73 IN | DIASTOLIC BLOOD PRESSURE: 60 MMHG | WEIGHT: 307 LBS | HEART RATE: 62 BPM | BODY MASS INDEX: 40.69 KG/M2

## 2023-11-10 DIAGNOSIS — K21.00 GASTROESOPHAGEAL REFLUX DISEASE WITH ESOPHAGITIS, UNSPECIFIED WHETHER HEMORRHAGE: ICD-10-CM

## 2023-11-10 DIAGNOSIS — I42.9 CARDIOMYOPATHY, UNSPECIFIED TYPE (MULTI): ICD-10-CM

## 2023-11-10 DIAGNOSIS — E78.5 HYPERLIPIDEMIA, UNSPECIFIED HYPERLIPIDEMIA TYPE: ICD-10-CM

## 2023-11-10 DIAGNOSIS — E13.59: ICD-10-CM

## 2023-11-10 DIAGNOSIS — F17.210 CIGARETTE NICOTINE DEPENDENCE, UNCOMPLICATED: ICD-10-CM

## 2023-11-10 DIAGNOSIS — I10 HYPERTENSION, UNSPECIFIED TYPE: ICD-10-CM

## 2023-11-10 DIAGNOSIS — J44.9 CHRONIC OBSTRUCTIVE PULMONARY DISEASE, UNSPECIFIED COPD TYPE (MULTI): ICD-10-CM

## 2023-11-10 DIAGNOSIS — I48.91 ATRIAL FIBRILLATION, UNSPECIFIED TYPE (MULTI): Primary | ICD-10-CM

## 2023-11-10 PROCEDURE — 3075F SYST BP GE 130 - 139MM HG: CPT | Performed by: INTERNAL MEDICINE

## 2023-11-10 PROCEDURE — 3078F DIAST BP <80 MM HG: CPT | Performed by: INTERNAL MEDICINE

## 2023-11-10 PROCEDURE — 4010F ACE/ARB THERAPY RXD/TAKEN: CPT | Performed by: INTERNAL MEDICINE

## 2023-11-10 PROCEDURE — 99214 OFFICE O/P EST MOD 30 MIN: CPT | Performed by: INTERNAL MEDICINE

## 2023-11-10 RX ORDER — METFORMIN HYDROCHLORIDE 1000 MG/1
1000 TABLET ORAL
COMMUNITY

## 2023-11-10 NOTE — PROGRESS NOTES
"Subjective   Arthur Thornton is a 58 y.o. male       Chief Complaint    Follow-up          HPI   Patient is in the office for follow-up after having recent ablation at [facility] Dr. Cabrera in October 2023 which has been successful.  He is irregular rhythm today and feels well without any complaints.  His pressure is upper normal range.  His heart rate is regular.  His lungs sounded normal.  He is compliant with medical therapy but need to quit smoking.  His weight remains way above target and due to inactivity caused by his hip problem.  His medication were reviewed with no changes needed.    Assessment/recommendations:     1-persistent atrial fibrillation.  Patient is status post successful ablation at [facility] with Dr. Cabrera.  He is not on antiarrhythmic therapy but he is on apixaban  2-normal Lexiscan perfusion study 2023.  3-history of PAD and toe amputation. PVR study demonstrated moderate stenosis in the right lower extremity. Tobacco cessation was encouraged along with current medical therapy and walking exercise program.  4-hypertension, currently  under control, present medical therapy will be left unchanged, low-salt diet and weight loss were emphasized  5-longstanding diabetes with neuropathy. This is managed by PCP.  6-hyperlipidemia, currently on rosuvastatin well-tolerated  7-COPD from heavy tobacco use. He is on inhalers and followed by PCP.  8-morbid obesity, encouraged the patient to reduce calorie consumption to get his weight under control.  9-history of sleep apnea intolerant to CPAP machine.  10-high risk medication with Eliquis without bleeding complications.  11-active tobacco abuse, encouraged the patient to quit smoking        Review of Systems   All other systems reviewed and are negative.         Visit Vitals  /60 (BP Location: Left arm, Patient Position: Sitting)   Pulse 62   Ht 1.854 m (6' 1\")   Wt 139 kg (307 lb)   BMI 40.50 kg/m²   Smoking Status Every Day "   BSA 2.68 m²        Objective   Physical Exam  Constitutional:       Appearance: Normal appearance. He is normal weight.   HENT:      Nose: Nose normal.   Neck:      Vascular: No carotid bruit.   Cardiovascular:      Rate and Rhythm: Normal rate.      Pulses: Normal pulses.      Heart sounds: Normal heart sounds.   Pulmonary:      Effort: Pulmonary effort is normal.   Abdominal:      General: Bowel sounds are normal.      Palpations: Abdomen is soft.   Genitourinary:     Rectum: Normal.   Musculoskeletal:         General: Normal range of motion.      Cervical back: Normal range of motion.      Right lower leg: No edema.      Left lower leg: No edema.   Skin:     General: Skin is warm and dry.   Neurological:      General: No focal deficit present.      Mental Status: He is alert.   Psychiatric:         Mood and Affect: Mood normal.         Behavior: Behavior normal.         Thought Content: Thought content normal.         Judgment: Judgment normal.         Current Medications    Current Outpatient Medications:     apixaban (Eliquis) 5 mg tablet, Take 1 tablet (5 mg) by mouth 2 times a day., Disp: , Rfl:     fluticasone propion-salmeteroL (Advair Diskus) 500-50 mcg/dose diskus inhaler, , Disp: , Rfl:     furosemide (Lasix) 20 mg tablet, Take 2 tablets (40 mg) by mouth once daily., Disp: , Rfl:     gabapentin (Neurontin) 300 mg capsule, Take 2 capsules (600 mg) by mouth early in the morning.., Disp: , Rfl:     metFORMIN (Glucophage) 1,000 mg tablet, Take 1 tablet (1,000 mg) by mouth 2 times a day with meals., Disp: , Rfl:     metoprolol tartrate (Lopressor) 50 mg tablet, Take 1 tablet by mouth 2 times a day., Disp: , Rfl:     omeprazole (PriLOSEC) 20 mg DR capsule, Take 1 capsule (20 mg) by mouth once daily in the morning. Take before meals., Disp: 30 capsule, Rfl: 0    potassium chloride CR (Klor-Con M20) 20 mEq ER tablet, , Disp: , Rfl:     rosuvastatin (Crestor) 20 mg tablet, Take 1 tablet (20 mg) by mouth once  daily., Disp: , Rfl:     valsartan (Diovan) 80 mg tablet, Take 1 tablet (80 mg) by mouth once daily., Disp: , Rfl:                      Assessment/Plan   1. Atrial fibrillation, unspecified type (CMS/[facility])        2. Hyperlipidemia, unspecified hyperlipidemia type        3. Hypertension, unspecified type        4. Cardiomyopathy, unspecified type (CMS/[facility])        5. Other specified diabetes mellitus with other circulatory complication, unspecified whether long term insulin use (CMS/[facility])        6. Gastroesophageal reflux disease with esophagitis, unspecified whether hemorrhage        7. Chronic obstructive pulmonary disease, unspecified COPD type (CMS/[facility])        8. Cigarette nicotine dependence, uncomplicated

## 2023-11-14 ENCOUNTER — APPOINTMENT (OUTPATIENT)
Dept: CARDIOLOGY | Facility: HOSPITAL | Age: 58
End: 2023-11-14
Payer: MEDICARE

## 2024-04-18 DIAGNOSIS — E78.2 MIXED HYPERLIPIDEMIA: ICD-10-CM

## 2024-04-18 RX ORDER — ROSUVASTATIN CALCIUM 20 MG/1
20 TABLET, COATED ORAL DAILY
Qty: 90 TABLET | Refills: 3 | Status: SHIPPED | OUTPATIENT
Start: 2024-04-18

## 2024-05-22 ENCOUNTER — APPOINTMENT (OUTPATIENT)
Dept: CARDIOLOGY | Facility: CLINIC | Age: 59
End: 2024-05-22
Payer: MEDICARE

## 2024-06-10 ENCOUNTER — OFFICE VISIT (OUTPATIENT)
Dept: CARDIOLOGY | Facility: CLINIC | Age: 59
End: 2024-06-10
Payer: MEDICARE

## 2024-06-10 VITALS
WEIGHT: 310 LBS | HEIGHT: 73 IN | SYSTOLIC BLOOD PRESSURE: 170 MMHG | HEART RATE: 63 BPM | BODY MASS INDEX: 41.08 KG/M2 | DIASTOLIC BLOOD PRESSURE: 70 MMHG

## 2024-06-10 DIAGNOSIS — E78.2 MIXED HYPERLIPIDEMIA: ICD-10-CM

## 2024-06-10 DIAGNOSIS — I10 PRIMARY HYPERTENSION: ICD-10-CM

## 2024-06-10 DIAGNOSIS — E66.01 MORBID OBESITY (MULTI): ICD-10-CM

## 2024-06-10 DIAGNOSIS — E11.59 TYPE 2 DIABETES MELLITUS WITH OTHER CIRCULATORY COMPLICATION, WITHOUT LONG-TERM CURRENT USE OF INSULIN (MULTI): ICD-10-CM

## 2024-06-10 DIAGNOSIS — I73.9 PVD (PERIPHERAL VASCULAR DISEASE) (CMS-HCC): ICD-10-CM

## 2024-06-10 DIAGNOSIS — J41.0 SIMPLE CHRONIC BRONCHITIS (MULTI): ICD-10-CM

## 2024-06-10 DIAGNOSIS — F17.210 CIGARETTE NICOTINE DEPENDENCE, UNCOMPLICATED: ICD-10-CM

## 2024-06-10 DIAGNOSIS — I48.91 ATRIAL FIBRILLATION, UNSPECIFIED TYPE (MULTI): ICD-10-CM

## 2024-06-10 DIAGNOSIS — I48.19 PERSISTENT ATRIAL FIBRILLATION (MULTI): Primary | ICD-10-CM

## 2024-06-10 PROCEDURE — 99214 OFFICE O/P EST MOD 30 MIN: CPT | Performed by: INTERNAL MEDICINE

## 2024-06-10 PROCEDURE — 3077F SYST BP >= 140 MM HG: CPT | Performed by: INTERNAL MEDICINE

## 2024-06-10 PROCEDURE — 4004F PT TOBACCO SCREEN RCVD TLK: CPT | Performed by: INTERNAL MEDICINE

## 2024-06-10 PROCEDURE — 93000 ELECTROCARDIOGRAM COMPLETE: CPT | Performed by: INTERNAL MEDICINE

## 2024-06-10 PROCEDURE — 3078F DIAST BP <80 MM HG: CPT | Performed by: INTERNAL MEDICINE

## 2024-06-10 PROCEDURE — 3008F BODY MASS INDEX DOCD: CPT | Performed by: INTERNAL MEDICINE

## 2024-06-10 RX ORDER — FUROSEMIDE 20 MG/1
20 TABLET ORAL DAILY
Qty: 90 TABLET | Refills: 3 | Status: SHIPPED | OUTPATIENT
Start: 2024-06-10 | End: 2025-06-10

## 2024-06-10 RX ORDER — AMLODIPINE AND VALSARTAN 5; 160 MG/1; MG/1
1 TABLET ORAL DAILY
Qty: 90 TABLET | Refills: 3 | Status: SHIPPED | OUTPATIENT
Start: 2024-06-10 | End: 2025-06-10

## 2024-06-10 ASSESSMENT — ENCOUNTER SYMPTOMS: SHORTNESS OF BREATH: 1

## 2024-06-10 NOTE — PATIENT INSTRUCTIONS
Please bring all medicines, vitamins, and herbal supplements with you when you come to the office.    Prescriptions will not be filled unless you are compliant with your follow up appointments or have a follow up appointment scheduled as per instruction of your physician. Refills should be requested at the time of your visit.     BMI was above normal measurement. Current weight: 141 kg (310 lb)  Weight change since last visit (-) denotes wt loss 3 lbs   Weight loss needed to achieve BMI 25: 120.9 Lbs  Weight loss needed to achieve BMI 30: 83.1 Lbs  Provided instructions on dietary changes.

## 2024-06-10 NOTE — PROGRESS NOTES
Subjective   Arthur Thornton is a 59 y.o. male       Chief Complaint    Follow-up          HPI   Patient is in the office for follow-up for the problems noted below.  He reports no breakthrough atrial fibrillation and his EKG today confirmed normal sinus rhythm therefore the Eliquis will be discontinued.  He was noted to be hypertensive in the office today.  Adjustment medication was made today.  He continues to smoke less than half a pack of cigarettes per day despite COPD and he is trying to quit smoking.  His weight remains way above target and was brought to his attention.  He continues to work and sees his PCP on regular basis.  He has sleep apnea but he has intolerance to CPAP machine.  His examination reveals diminished breath sounds in his lungs along with morbid obesity and hypertension    Assessment/recommendations:     1-persistent atrial fibrillation.  Patient is status post successful ablation at [facility] with Dr. Cabrera.  He is not on antiarrhythmic therapy but he is on apixaban, EKG today confirmed that he remains in sinus rhythm therefore apixaban will be discontinued  2-normal Lexiscan perfusion study 2023.  3-history of PAD and toe amputation. PVR study demonstrated moderate stenosis in the right lower extremity. Tobacco cessation was encouraged along with current medical therapy and walking exercise program.  4-essential hypertension, currently not under control, will add amlodipine 5 mg combined with valsartan 160 mg daily replacing valsartan 80 mg daily with blood work and BP check in the near future.  5-longstanding diabetes with neuropathy. This is managed by PCP.  6-hyperlipidemia, currently on rosuvastatin well-tolerated  7-COPD from heavy tobacco use. He is on inhalers and followed by PCP.  8-morbid obesity, encouraged the patient to reduce calorie consumption to get his weight under control.  9-history of sleep apnea intolerant to CPAP machine.  10-active tobacco abuse, encouraged  "the patient to quit smoking     Review of Systems   Respiratory:  Positive for shortness of breath.    All other systems reviewed and are negative.           Vitals:    06/10/24 1410 06/10/24 1436   BP: (!) 164/94 170/70   BP Location: Right arm Right arm   Patient Position: Sitting Sitting   Pulse: 63    Weight: 141 kg (310 lb)    Height: 1.854 m (6' 1\")         Objective   Physical Exam  Constitutional:       Appearance: Normal appearance.   HENT:      Nose: Nose normal.   Neck:      Vascular: No carotid bruit.   Cardiovascular:      Rate and Rhythm: Normal rate.      Pulses: Normal pulses.      Heart sounds: Normal heart sounds.   Pulmonary:      Effort: Pulmonary effort is normal.   Abdominal:      General: Bowel sounds are normal.      Palpations: Abdomen is soft.   Musculoskeletal:         General: Normal range of motion.      Cervical back: Normal range of motion.      Right lower leg: No edema.      Left lower leg: No edema.   Skin:     General: Skin is warm and dry.   Neurological:      General: No focal deficit present.      Mental Status: He is alert.   Psychiatric:         Mood and Affect: Mood normal.         Behavior: Behavior normal.         Thought Content: Thought content normal.         Judgment: Judgment normal.         Allergies  Patient has no known allergies.     Current Medications    Current Outpatient Medications:     fluticasone propion-salmeteroL (Advair Diskus) 500-50 mcg/dose diskus inhaler, , Disp: , Rfl:     gabapentin (Neurontin) 300 mg capsule, Take 2 capsules (600 mg) by mouth early in the morning.., Disp: , Rfl:     metFORMIN (Glucophage) 1,000 mg tablet, Take 1 tablet (1,000 mg) by mouth 2 times daily (morning and late afternoon)., Disp: , Rfl:     metoprolol tartrate (Lopressor) 50 mg tablet, Take 1 tablet by mouth 2 times a day., Disp: , Rfl:     omeprazole (PriLOSEC) 20 mg DR capsule, Take 1 capsule (20 mg) by mouth once daily in the morning. Take before meals., Disp: 30 " capsule, Rfl: 0    potassium chloride CR (Klor-Con M20) 20 mEq ER tablet, , Disp: , Rfl:     rosuvastatin (Crestor) 20 mg tablet, Take 1 tablet by mouth once daily, Disp: 90 tablet, Rfl: 3    amlodipine-valsartan (Exforge) 5-160 mg tablet, Take 1 tablet by mouth once daily., Disp: 90 tablet, Rfl: 3    furosemide (Lasix) 20 mg tablet, Take 1 tablet (20 mg) by mouth once daily., Disp: 90 tablet, Rfl: 3                     Assessment/Plan   1. Morbid obesity (Multi)        2. Atrial fibrillation, unspecified type (Multi)  Follow Up In Cardiology      3. Persistent atrial fibrillation (Multi)  Follow Up In Cardiology    ECG 12 Lead      4. Mixed hyperlipidemia        5. Primary hypertension  furosemide (Lasix) 20 mg tablet    amlodipine-valsartan (Exforge) 5-160 mg tablet    Follow Up In Cardiology      6. Type 2 diabetes mellitus with other circulatory complication, without long-term current use of insulin (Multi)        7. PVD (peripheral vascular disease) (CMS-[facility])        8. BMI 40.0-44.9, adult (Multi)        9. Cigarette nicotine dependence, uncomplicated        10. Simple chronic bronchitis (Multi)                 Scribe Attestation  By signing my name below, I, Emily Denny LPN   attest that this documentation has been prepared under the direction and in the presence of Ciarra Andres MD.     Provider Attestation - Scribe documentation    All medical record entries made by the Scribe were at my direction and personally dictated by me. I have reviewed the chart and agree that the record accurately reflects my personal performance of the history, physical exam, discussion and plan.

## 2024-06-10 NOTE — LETTER
Heydi 10, 2024     Americo Dunn DO  [address] [facility]  [place] [facility] [address]    Patient: Arthur Thornton   YOB: 1965   Date of Visit: 6/10/2024       Dear Dr. Americo Dunn, DO:    Thank you for referring Arthur Thornton to me for evaluation. Below are my notes for this consultation.  If you have questions, please do not hesitate to call me. I look forward to following your patient along with you.       Sincerely,     Ciarra Andres MD      CC: No Recipients  ______________________________________________________________________________________    Subjective   Arthur Thornton is a 59 y.o. male       Chief Complaint    Follow-up          HPI   Patient is in the office for follow-up for the problems noted below.  He reports no breakthrough atrial fibrillation and his EKG today confirmed normal sinus rhythm therefore the Eliquis will be discontinued.  He was noted to be hypertensive in the office today.  Adjustment medication was made today.  He continues to smoke less than half a pack of cigarettes per day despite COPD and he is trying to quit smoking.  His weight remains way above target and was brought to his attention.  He continues to work and sees his PCP on regular basis.  He has sleep apnea but he has intolerance to CPAP machine.  His examination reveals diminished breath sounds in his lungs along with morbid obesity and hypertension    Assessment/recommendations:     1-persistent atrial fibrillation.  Patient is status post successful ablation at [facility] with Dr. Cabrera.  He is not on antiarrhythmic therapy but he is on apixaban, EKG today confirmed that he remains in sinus rhythm therefore apixaban will be discontinued  2-normal Lexiscan perfusion study 2023.  3-history of PAD and toe amputation. PVR study demonstrated moderate stenosis in the right lower extremity. Tobacco cessation was encouraged along with current medical therapy and walking exercise  "program.  4-essential hypertension, currently not under control, will add amlodipine 5 mg combined with valsartan 160 mg daily replacing valsartan 80 mg daily with blood work and BP check in the near future.  5-longstanding diabetes with neuropathy. This is managed by PCP.  6-hyperlipidemia, currently on rosuvastatin well-tolerated  7-COPD from heavy tobacco use. He is on inhalers and followed by PCP.  8-morbid obesity, encouraged the patient to reduce calorie consumption to get his weight under control.  9-history of sleep apnea intolerant to CPAP machine.  10-active tobacco abuse, encouraged the patient to quit smoking     Review of Systems   Respiratory:  Positive for shortness of breath.    All other systems reviewed and are negative.           Vitals:    06/10/24 1410 06/10/24 1436   BP: (!) 164/94 170/70   BP Location: Right arm Right arm   Patient Position: Sitting Sitting   Pulse: 63    Weight: 141 kg (310 lb)    Height: 1.854 m (6' 1\")         Objective   Physical Exam  Constitutional:       Appearance: Normal appearance.   HENT:      Nose: Nose normal.   Neck:      Vascular: No carotid bruit.   Cardiovascular:      Rate and Rhythm: Normal rate.      Pulses: Normal pulses.      Heart sounds: Normal heart sounds.   Pulmonary:      Effort: Pulmonary effort is normal.   Abdominal:      General: Bowel sounds are normal.      Palpations: Abdomen is soft.   Musculoskeletal:         General: Normal range of motion.      Cervical back: Normal range of motion.      Right lower leg: No edema.      Left lower leg: No edema.   Skin:     General: Skin is warm and dry.   Neurological:      General: No focal deficit present.      Mental Status: He is alert.   Psychiatric:         Mood and Affect: Mood normal.         Behavior: Behavior normal.         Thought Content: Thought content normal.         Judgment: Judgment normal.         Allergies  Patient has no known allergies.     Current Medications    Current Outpatient " Medications:   •  fluticasone propion-salmeteroL (Advair Diskus) 500-50 mcg/dose diskus inhaler, , Disp: , Rfl:   •  gabapentin (Neurontin) 300 mg capsule, Take 2 capsules (600 mg) by mouth early in the morning.., Disp: , Rfl:   •  metFORMIN (Glucophage) 1,000 mg tablet, Take 1 tablet (1,000 mg) by mouth 2 times daily (morning and late afternoon)., Disp: , Rfl:   •  metoprolol tartrate (Lopressor) 50 mg tablet, Take 1 tablet by mouth 2 times a day., Disp: , Rfl:   •  omeprazole (PriLOSEC) 20 mg DR capsule, Take 1 capsule (20 mg) by mouth once daily in the morning. Take before meals., Disp: 30 capsule, Rfl: 0  •  potassium chloride CR (Klor-Con M20) 20 mEq ER tablet, , Disp: , Rfl:   •  rosuvastatin (Crestor) 20 mg tablet, Take 1 tablet by mouth once daily, Disp: 90 tablet, Rfl: 3  •  amlodipine-valsartan (Exforge) 5-160 mg tablet, Take 1 tablet by mouth once daily., Disp: 90 tablet, Rfl: 3  •  furosemide (Lasix) 20 mg tablet, Take 1 tablet (20 mg) by mouth once daily., Disp: 90 tablet, Rfl: 3                     Assessment/Plan   1. Morbid obesity (Multi)        2. Atrial fibrillation, unspecified type (Multi)  Follow Up In Cardiology      3. Persistent atrial fibrillation (Multi)  Follow Up In Cardiology    ECG 12 Lead      4. Mixed hyperlipidemia        5. Primary hypertension  furosemide (Lasix) 20 mg tablet    amlodipine-valsartan (Exforge) 5-160 mg tablet    Follow Up In Cardiology      6. Type 2 diabetes mellitus with other circulatory complication, without long-term current use of insulin (Multi)        7. PVD (peripheral vascular disease) (CMS-[facility])        8. BMI 40.0-44.9, adult (Multi)        9. Cigarette nicotine dependence, uncomplicated        10. Simple chronic bronchitis (Multi)                 Scribe Attestation  By signing my name below, Jessenia RODRIGUEZ LPN  , Scribe   attest that this documentation has been prepared under the direction and in the presence of Ciarra Andres MD.     Provider  Attestation - Scribe documentation    All medical record entries made by the Scribe were at my direction and personally dictated by me. I have reviewed the chart and agree that the record accurately reflects my personal performance of the history, physical exam, discussion and plan.

## 2024-07-22 ENCOUNTER — APPOINTMENT (OUTPATIENT)
Dept: CARDIOLOGY | Facility: CLINIC | Age: 59
End: 2024-07-22
Payer: MEDICARE

## 2024-07-23 ENCOUNTER — TELEPHONE (OUTPATIENT)
Dept: CARDIOLOGY | Facility: CLINIC | Age: 59
End: 2024-07-23
Payer: MEDICARE

## 2024-07-23 NOTE — TELEPHONE ENCOUNTER
Just FYI - 7/23: Spoke to patient in regards to no show for BP check yesterday. He stated he went to his PCP today and his blood pressure was 140/68. Patient stated the medication changed helped and he will be monitoring at home and with his PCP. Did not wish to reschedule.

## 2024-12-09 ENCOUNTER — TELEPHONE (OUTPATIENT)
Dept: VASCULAR SURGERY | Age: 59
End: 2024-12-09

## 2025-01-10 ENCOUNTER — APPOINTMENT (OUTPATIENT)
Dept: CARDIOLOGY | Facility: CLINIC | Age: 60
End: 2025-01-10
Payer: MEDICARE

## 2025-07-02 DIAGNOSIS — I10 PRIMARY HYPERTENSION: ICD-10-CM

## 2025-07-08 RX ORDER — AMLODIPINE AND VALSARTAN 5; 160 MG/1; MG/1
TABLET ORAL
Qty: 90 TABLET | Refills: 0 | Status: SHIPPED | OUTPATIENT
Start: 2025-07-08

## (undated) DEVICE — CATHETER, EPL, 7FR DUO, 2/8 2M 95CM, STEERABLE LGCRL

## (undated) DEVICE — SHIELD, RADPAD, EP LEFT SUBCLAVIAN, 12.5 X 16.5, YELLOW LEVEL ATTENUATION

## (undated) DEVICE — CABLE, CONNECTOR, DAIG RESPONSE, 210CM, BLACK

## (undated) DEVICE — CLOSURE SYSTEM, VASCULAR, MVP 6-12FR, VENOUS

## (undated) DEVICE — CATHETER, ACUSON ACUNAV ULTRASOUND, 8FR 90CM  (REPROCESSED)

## (undated) DEVICE — CABLE, 34 HYP, 34 LEMO, 10FT, SMART TOUCH

## (undated) DEVICE — CATHETER, THERMOCOOL SMART TOUCH, SF, D-F CURVE

## (undated) DEVICE — NEEDLE, NRG TRANSSEPTAL, 98CM, CURVE C0

## (undated) DEVICE — SHEATH, STEERABLE, SUREFLEX, MEDIUM CURVE

## (undated) DEVICE — TUBING SET, IRRIGATION, SMARTABLATE

## (undated) DEVICE — PATCHES, EXTERNAL REFERENCE, CARTO3

## (undated) DEVICE — CABLE, CONNECTOR, 10FT (REPROCESSED)

## (undated) DEVICE — CABLE, CARTO 3 SYSTEM, ECO INTERFACE, 34-PIN, SPLIT HANDLE (REPROCESSED)

## (undated) DEVICE — CONNECTOR, CATHETER, RESPONSE, RED HEX

## (undated) DEVICE — INTRODUCER, SHEATH, FAST-CATH, 8FR X 12CM, C-LOCK

## (undated) DEVICE — CATHETER, PENTARAY, NAV ECO, 7FR, 2-6-2 SPACING, D CURVE

## (undated) DEVICE — INTRODUCER, HEMOSTASIS, STR/J .038 IN, 8.5FR 12CM